# Patient Record
Sex: FEMALE | Race: WHITE | NOT HISPANIC OR LATINO | Employment: UNEMPLOYED | URBAN - METROPOLITAN AREA
[De-identification: names, ages, dates, MRNs, and addresses within clinical notes are randomized per-mention and may not be internally consistent; named-entity substitution may affect disease eponyms.]

---

## 2017-03-02 ENCOUNTER — OFFICE VISIT (OUTPATIENT)
Dept: FAMILY MEDICINE CLINIC | Facility: CLINIC | Age: 22
End: 2017-03-02

## 2017-03-02 VITALS
DIASTOLIC BLOOD PRESSURE: 70 MMHG | RESPIRATION RATE: 16 BRPM | HEART RATE: 70 BPM | SYSTOLIC BLOOD PRESSURE: 108 MMHG | HEIGHT: 64 IN | WEIGHT: 120 LBS | BODY MASS INDEX: 20.49 KG/M2 | OXYGEN SATURATION: 98 %

## 2017-03-02 DIAGNOSIS — F43.21 GRIEF: ICD-10-CM

## 2017-03-02 DIAGNOSIS — F41.9 ANXIETY: Primary | ICD-10-CM

## 2017-03-02 PROCEDURE — 99213 OFFICE O/P EST LOW 20 MIN: CPT | Performed by: INTERNAL MEDICINE

## 2017-03-02 RX ORDER — NORETHINDRONE ACETATE AND ETHINYL ESTRADIOL, ETHINYL ESTRADIOL AND FERROUS FUMARATE 1MG-10(24)
KIT ORAL
COMMUNITY
Start: 2017-01-29 | End: 2017-07-18

## 2017-03-02 NOTE — PROGRESS NOTES
"Subjective   Patient ID: Casper Monzon is a 21 y.o. female presents with   Chief Complaint   Patient presents with   • Anxiety     Bird Patient- father passed away suddenly, needs medication for anxiety, depression, panic attacks, and insomnia   • Depression       HPI - this patient presents today with grief and anxiety.  Her father  in the last few days.  There is grief counseling arranged according to her mother.    Assessment plan    Anxiety grief insomnia-recommend grief counseling patient may take Xanax as needed however, she didn't want a prescription currently.    Allergies   Allergen Reactions   • No Known Drug Allergy        The following portions of the patient's history were reviewed and updated as appropriate: allergies, current medications, past family history, past medical history, past social history, past surgical history and problem list.      Review of Systems   Constitutional: Positive for fatigue.   HENT: Negative.    Respiratory: Negative.    Cardiovascular: Negative.    Psychiatric/Behavioral: Positive for sleep disturbance. The patient is nervous/anxious.        Objective     Vitals:    17 0850   BP: 108/70   Pulse: 70   Resp: 16   SpO2: 98%   Weight: 120 lb (54.4 kg)   Height: 64\" (162.6 cm)         Physical Exam   Constitutional: She is oriented to person, place, and time. She appears well-developed and well-nourished.   HENT:   Head: Normocephalic and atraumatic.   Cardiovascular: Normal rate, regular rhythm and normal heart sounds.    Pulmonary/Chest: Effort normal and breath sounds normal.   Neurological: She is alert and oriented to person, place, and time.   Nursing note and vitals reviewed.        Casper was seen today for anxiety and depression.    Diagnoses and all orders for this visit:    Anxiety    Grief        Call or return to clinic prn if these symptoms worsen or fail to improve as anticipated.  "

## 2017-07-18 ENCOUNTER — OFFICE VISIT (OUTPATIENT)
Dept: FAMILY MEDICINE CLINIC | Facility: CLINIC | Age: 22
End: 2017-07-18

## 2017-07-18 VITALS
HEART RATE: 82 BPM | TEMPERATURE: 97.7 F | HEIGHT: 64 IN | BODY MASS INDEX: 26.14 KG/M2 | OXYGEN SATURATION: 100 % | DIASTOLIC BLOOD PRESSURE: 68 MMHG | WEIGHT: 153.1 LBS | SYSTOLIC BLOOD PRESSURE: 104 MMHG

## 2017-07-18 DIAGNOSIS — J06.9 VIRAL URI: Primary | ICD-10-CM

## 2017-07-18 DIAGNOSIS — F41.9 ANXIETY: ICD-10-CM

## 2017-07-18 PROCEDURE — 99213 OFFICE O/P EST LOW 20 MIN: CPT | Performed by: FAMILY MEDICINE

## 2017-07-18 RX ORDER — PSEUDOEPHEDRINE HCL 30 MG
30 TABLET ORAL EVERY 4 HOURS PRN
COMMUNITY
End: 2020-09-08

## 2017-07-18 NOTE — PROGRESS NOTES
"Subjective   Casper Monzon is a 22 y.o. female.     Chief Complaint   Patient presents with   • URI     head cold x fri   • Sore Throat   • Stress     her father passed a way in feb and moving to [place]        History of Present Illness    4 days of URI symptoms.  Sore throat.  And sinus congestion.  Now some cough and congestion in the chest.  No high fever.  No shortness of breath.  Patient had some anxiety symptoms.  Some occasional muscle tension discomfort in the chest.  Going on for a few months.  Father  of what sounds like complications from sepsis a few months ago.  She states she is coping.  No panic attacks.  Some mild and he don't he.  Less exercise.  However no major depression symptoms.  No panic attacks.  She is going to be moving to [place] with her  to attend a [employer]ist education institution.         The following portions of the patient's history were reviewed and updated as appropriate: allergies, current medications, past family history, past medical history, past social history, past surgical history and problem list.          Review of Systems   Constitutional: Negative for fatigue and fever.   HENT: Positive for congestion and sore throat.    Respiratory: Positive for cough.    Gastrointestinal: Positive for diarrhea ( Mild).   Skin: Negative for rash.   Psychiatric/Behavioral: Negative for dysphoric mood. The patient is nervous/anxious.        Objective   Blood pressure 104/68, pulse 82, temperature 97.7 °F (36.5 °C), temperature source Oral, height 64\" (162.6 cm), weight 153 lb 1.6 oz (69.4 kg), SpO2 100 %.  Physical Exam   Constitutional: No distress.   No acute distress.  Nontoxic.   HENT:   Right Ear: Tympanic membrane, external ear and ear canal normal.   Left Ear: Tympanic membrane, external ear and ear canal normal.   Nose: Nose normal.   Mouth/Throat: Oropharynx is clear and moist. No oropharyngeal exudate.   Eyes: Conjunctivae are normal. Right eye exhibits no discharge. " Left eye exhibits no discharge. No scleral icterus.   Cardiovascular: Normal rate.    Pulmonary/Chest: Effort normal and breath sounds normal. No stridor. No respiratory distress. She has no wheezes. She has no rales.   No tachypnea   Lymphadenopathy:     She has no cervical adenopathy.   Skin: No rash noted.   Nursing note and vitals reviewed.      Assessment/Plan   Casper was seen today for uri, sore throat and stress.    Diagnoses and all orders for this visit:    Viral URI    Anxiety    Very probable viral URI.  Recommend continuing over-the-counter medication.  She's been taking some pseudoephedrine and his help some of the sinus congestion.  She'll be leaving town in a few weeks to move to [place].  With high fever, or worsening symptoms start a Z-Nicola, handwritten prescription given.  Then seek medical attention for further investigation.  Patient will call with questions.    Anxiety.  Some mild grief.  Overall coping well.  No red flags.  Recommend increased exercise, consider cognitive behavioral therapy with a counselor, and also recommend mindfulness meditation.  This was discussed.  This time no need for medication.

## 2020-09-08 ENCOUNTER — OFFICE VISIT (OUTPATIENT)
Dept: FAMILY MEDICINE CLINIC | Facility: CLINIC | Age: 25
End: 2020-09-08

## 2020-09-08 ENCOUNTER — RESULTS ENCOUNTER (OUTPATIENT)
Dept: FAMILY MEDICINE CLINIC | Facility: CLINIC | Age: 25
End: 2020-09-08

## 2020-09-08 VITALS
DIASTOLIC BLOOD PRESSURE: 88 MMHG | BODY MASS INDEX: 30.35 KG/M2 | OXYGEN SATURATION: 98 % | TEMPERATURE: 97.7 F | SYSTOLIC BLOOD PRESSURE: 130 MMHG | HEIGHT: 65 IN | HEART RATE: 90 BPM | WEIGHT: 182.2 LBS

## 2020-09-08 DIAGNOSIS — Z00.00 ROUTINE PHYSICAL EXAMINATION: ICD-10-CM

## 2020-09-08 DIAGNOSIS — E78.2 MIXED HYPERLIPIDEMIA: ICD-10-CM

## 2020-09-08 DIAGNOSIS — F41.9 ANXIETY: ICD-10-CM

## 2020-09-08 DIAGNOSIS — R03.0 ELEVATED BLOOD PRESSURE READING: Primary | ICD-10-CM

## 2020-09-08 PROCEDURE — 99204 OFFICE O/P NEW MOD 45 MIN: CPT | Performed by: FAMILY MEDICINE

## 2020-09-08 RX ORDER — MULTIVITAMIN WITH IRON
TABLET ORAL
COMMUNITY

## 2020-09-08 RX ORDER — LANOLIN ALCOHOL/MO/W.PET/CERES
400 CREAM (GRAM) TOPICAL DAILY
Qty: 30 TABLET | Refills: 0 | Status: SHIPPED | OUTPATIENT
Start: 2020-09-08

## 2020-09-08 RX ORDER — HYDROXYZINE HYDROCHLORIDE 25 MG/1
25 TABLET, FILM COATED ORAL 3 TIMES DAILY PRN
Qty: 30 TABLET | Refills: 0 | Status: SHIPPED | OUTPATIENT
Start: 2020-09-08

## 2020-09-08 NOTE — PROGRESS NOTES
Subjective   Casper ROBERTSON is a 25 y.o. female.     Chief Complaint   Patient presents with   • Hypertension     NP est, Has Hx of Pre-eclampsia with still birth. C/o of muscle twitches in legs, butt and eyes with odd tingles in her scalp. Tried magnesium to see if it helps but isn't sure if its effective. Wanting to try conceiving again after knowing if she needs other things in line. Was told to have US and genetic testing due to child having only one kidney.    • Hyperlipidemia       History of Present Illness Casper ROBERTSON is a 25-year-old female patient came here to establish care, routine physical and follow-up on her blood pressure.  Patient lost her baby at 7 months 10 weeks ago.  She is giving history of preeclampsia at that time.  She was discharged home on Procardia.  She is checking her blood pressure every day at home and her blood pressures ranging from 127//90.  He denies any headache blurring of vision or dizziness.  She stopped taking Procardia since July.  Patient denies any history of blood pressure before her pregnancy.  She is also complaining of muscle spasms.  She also giving history of increased anxiety and panic attack.        Past Medical History:   Diagnosis Date   • H/O intrauterine death    • Preeclampsia, severe, second trimester        Past Surgical History:   Procedure Laterality Date   • WISDOM TOOTH EXTRACTION         Family History   Problem Relation Age of Onset   • Hypertension Mother    • Pulmonary embolism Father    • Pneumonia Father    • Diabetes Maternal Aunt    • Hypertension Paternal Grandmother    • Diabetes Paternal Grandmother        Social History     Socioeconomic History   • Marital status:      Spouse name: Not on file   • Number of children: Not on file   • Years of education: Not on file   • Highest education level: Not on file   Tobacco Use   • Smoking status: Never Smoker   • Smokeless tobacco: Never Used   Substance and Sexual Activity   •  "Alcohol use: Yes     Comment: Socially   • Drug use: No   • Sexual activity: Yes     Partners: Male       The following portions of the patient's history were reviewed and updated as appropriate: allergies, current medications, past family history, past medical history, past social history, past surgical history and problem list.    Review of Systems   Constitutional: Negative for activity change, fatigue, fever, unexpected weight gain and unexpected weight loss.   HENT: Negative for congestion, mouth sores, sinus pressure and sore throat.    Eyes: Negative for blurred vision and visual disturbance.   Respiratory: Negative for cough, chest tightness, shortness of breath and wheezing.    Cardiovascular: Negative for chest pain, palpitations and leg swelling.   Gastrointestinal: Negative for abdominal pain, constipation, diarrhea, nausea, vomiting and indigestion.   Endocrine: Negative for cold intolerance, polydipsia and polyuria.   Genitourinary: Negative for dysuria, frequency, hematuria, urgency and urinary incontinence.   Musculoskeletal: Negative for back pain, gait problem and neck pain.   Skin: Negative for color change and rash.   Neurological: Negative for dizziness, speech difficulty, weakness, headache and confusion.   Psychiatric/Behavioral: Negative for agitation, sleep disturbance and depressed mood. The patient is not nervous/anxious.            Objective   Vitals:    09/08/20 1330 09/08/20 1422   BP: 140/90 130/88   Pulse: 90    Temp: 97.7 °F (36.5 °C)    SpO2: 98%    Weight: 82.6 kg (182 lb 3.2 oz)    Height: 165.1 cm (65\")      Body mass index is 30.32 kg/m².  Physical Exam   Constitutional: She is oriented to person, place, and time. She appears well-nourished. No distress.   HENT:   Head: Normocephalic and atraumatic.   Right Ear: External ear normal.   Left Ear: External ear normal.   Nose: Nose normal.   Mouth/Throat: Oropharynx is clear and moist.   Eyes: Pupils are equal, round, and reactive " to light. Conjunctivae are normal.   Neck: Normal range of motion. Neck supple.   Cardiovascular: Normal rate, regular rhythm and normal heart sounds.   Pulmonary/Chest: Effort normal and breath sounds normal. She has no wheezes. She has no rales.   Abdominal: Soft. Bowel sounds are normal. She exhibits no distension. There is no tenderness.   Musculoskeletal: Normal range of motion.   Lymphadenopathy:     She has no cervical adenopathy.   Neurological: She is alert and oriented to person, place, and time. No cranial nerve deficit or sensory deficit. She exhibits normal muscle tone.   Skin: Skin is warm. Capillary refill takes less than 2 seconds. No rash noted.   Psychiatric: She has a normal mood and affect. Her behavior is normal.   Nursing note and vitals reviewed.      Assessment/Plan   Problem List Items Addressed This Visit        Other    Anxiety    Relevant Orders    CBC & Differential (Completed)    TSH+Free T4 (Completed)      Other Visit Diagnoses     Elevated blood pressure reading    -  Primary    Relevant Orders    Comprehensive Metabolic Panel (Completed)    Uric Acid (Completed)    US Renal Bilateral    Mixed hyperlipidemia        Relevant Orders    Lipid Panel (Completed)    Routine physical examination        Relevant Orders    CBC & Differential (Completed)    Comprehensive Metabolic Panel (Completed)    Urinalysis With Culture If Indicated - Urine, Random Void (Completed)      Casper ROBERTSON is a 25-year-old female patient came here for  Routine physical, preventive screening and healthy lifestyle discussed with patient.  He is up-to-date Tdap and Pap smear.  Flu vaccine recommended to patient.  Denies any history of high blood pressure before her pregnancy.  Mom developed blood high blood pressure during in her 50s.  She does not smoke.  DASH diet recommended to patient.  I also advised her to keep a blood pressure log.  With her history of preeclampsia and increased blood pressure.  She is  postpartum almost 10 weeks.  I will do renal ultrasound to rule out renal artery stenosis.  We will also do baseline labs including kidney function.  Hyperlipidemia, patient with history of hyperlipidemia in the past but not on medication.  We will recheck her lipids.  Anxiety, complaining of panic attack.  She has a history of anxiety in the past but does not want to take the medication.  I will start her on hydroxyzine PRN for acute anxiety.  Counseling done.  Advised her to also follow-up with her OB.        Return in about 3 weeks (around 9/29/2020), or if symptoms worsen or fail to improve.

## 2020-09-09 ENCOUNTER — LAB (OUTPATIENT)
Dept: LAB | Facility: HOSPITAL | Age: 25
End: 2020-09-09

## 2020-09-09 DIAGNOSIS — R03.0 ELEVATED BLOOD PRESSURE READING: ICD-10-CM

## 2020-09-09 DIAGNOSIS — Z00.00 ROUTINE PHYSICAL EXAMINATION: ICD-10-CM

## 2020-09-09 DIAGNOSIS — F41.9 ANXIETY: ICD-10-CM

## 2020-09-09 DIAGNOSIS — E78.2 MIXED HYPERLIPIDEMIA: ICD-10-CM

## 2020-09-09 LAB
ALBUMIN SERPL-MCNC: 4.6 G/DL (ref 3.5–5.2)
ALBUMIN/GLOB SERPL: 1.6 G/DL
ALP SERPL-CCNC: 60 U/L (ref 39–117)
ALT SERPL W P-5'-P-CCNC: 28 U/L (ref 1–33)
ANION GAP SERPL CALCULATED.3IONS-SCNC: 10 MMOL/L (ref 5–15)
AST SERPL-CCNC: 24 U/L (ref 1–32)
BASOPHILS # BLD AUTO: 0.06 10*3/MM3 (ref 0–0.2)
BASOPHILS NFR BLD AUTO: 0.9 % (ref 0–1.5)
BILIRUB SERPL-MCNC: 0.3 MG/DL (ref 0–1.2)
BILIRUB UR QL STRIP: NEGATIVE
BUN SERPL-MCNC: 12 MG/DL (ref 6–20)
BUN/CREAT SERPL: 15.6 (ref 7–25)
CALCIUM SPEC-SCNC: 9.5 MG/DL (ref 8.6–10.5)
CHLORIDE SERPL-SCNC: 104 MMOL/L (ref 98–107)
CHOLEST SERPL-MCNC: 194 MG/DL (ref 0–200)
CLARITY UR: CLEAR
CO2 SERPL-SCNC: 24 MMOL/L (ref 22–29)
COLOR UR: YELLOW
CREAT SERPL-MCNC: 0.77 MG/DL (ref 0.57–1)
DEPRECATED RDW RBC AUTO: 41.1 FL (ref 37–54)
EOSINOPHIL # BLD AUTO: 0.12 10*3/MM3 (ref 0–0.4)
EOSINOPHIL NFR BLD AUTO: 1.7 % (ref 0.3–6.2)
ERYTHROCYTE [DISTWIDTH] IN BLOOD BY AUTOMATED COUNT: 12.4 % (ref 12.3–15.4)
GFR SERPL CREATININE-BSD FRML MDRD: 90 ML/MIN/1.73
GLOBULIN UR ELPH-MCNC: 2.9 GM/DL
GLUCOSE SERPL-MCNC: 96 MG/DL (ref 65–99)
GLUCOSE UR STRIP-MCNC: NEGATIVE MG/DL
HCT VFR BLD AUTO: 40.6 % (ref 34–46.6)
HDLC SERPL-MCNC: 74 MG/DL (ref 40–60)
HGB BLD-MCNC: 13.6 G/DL (ref 12–15.9)
HGB UR QL STRIP.AUTO: NEGATIVE
IMM GRANULOCYTES # BLD AUTO: 0.01 10*3/MM3 (ref 0–0.05)
IMM GRANULOCYTES NFR BLD AUTO: 0.1 % (ref 0–0.5)
KETONES UR QL STRIP: NEGATIVE
LDLC SERPL CALC-MCNC: 111 MG/DL (ref 0–100)
LDLC/HDLC SERPL: 1.5 {RATIO}
LEUKOCYTE ESTERASE UR QL STRIP.AUTO: NEGATIVE
LYMPHOCYTES # BLD AUTO: 1.9 10*3/MM3 (ref 0.7–3.1)
LYMPHOCYTES NFR BLD AUTO: 27.1 % (ref 19.6–45.3)
MCH RBC QN AUTO: 30.2 PG (ref 26.6–33)
MCHC RBC AUTO-ENTMCNC: 33.5 G/DL (ref 31.5–35.7)
MCV RBC AUTO: 90 FL (ref 79–97)
MONOCYTES # BLD AUTO: 0.45 10*3/MM3 (ref 0.1–0.9)
MONOCYTES NFR BLD AUTO: 6.4 % (ref 5–12)
NEUTROPHILS NFR BLD AUTO: 4.48 10*3/MM3 (ref 1.7–7)
NEUTROPHILS NFR BLD AUTO: 63.8 % (ref 42.7–76)
NITRITE UR QL STRIP: NEGATIVE
NRBC BLD AUTO-RTO: 0 /100 WBC (ref 0–0.2)
PH UR STRIP.AUTO: 7 [PH] (ref 5–8)
PLATELET # BLD AUTO: 276 10*3/MM3 (ref 140–450)
PMV BLD AUTO: 9.7 FL (ref 6–12)
POTASSIUM SERPL-SCNC: 4.4 MMOL/L (ref 3.5–5.2)
PROT SERPL-MCNC: 7.5 G/DL (ref 6–8.5)
PROT UR QL STRIP: NEGATIVE
RBC # BLD AUTO: 4.51 10*6/MM3 (ref 3.77–5.28)
SODIUM SERPL-SCNC: 138 MMOL/L (ref 136–145)
SP GR UR STRIP: 1.02 (ref 1–1.03)
T4 FREE SERPL-MCNC: 1.37 NG/DL (ref 0.93–1.7)
TRIGL SERPL-MCNC: 45 MG/DL (ref 0–150)
TSH SERPL DL<=0.05 MIU/L-ACNC: 1.06 UIU/ML (ref 0.27–4.2)
URATE SERPL-MCNC: 4 MG/DL (ref 2.4–5.7)
UROBILINOGEN UR QL STRIP: NORMAL
VLDLC SERPL-MCNC: 9 MG/DL (ref 5–40)
WBC # BLD AUTO: 7.02 10*3/MM3 (ref 3.4–10.8)

## 2020-09-09 PROCEDURE — 84443 ASSAY THYROID STIM HORMONE: CPT | Performed by: FAMILY MEDICINE

## 2020-09-09 PROCEDURE — 84439 ASSAY OF FREE THYROXINE: CPT | Performed by: FAMILY MEDICINE

## 2020-09-09 PROCEDURE — 80053 COMPREHEN METABOLIC PANEL: CPT

## 2020-09-09 PROCEDURE — 81003 URINALYSIS AUTO W/O SCOPE: CPT | Performed by: FAMILY MEDICINE

## 2020-09-09 PROCEDURE — 84550 ASSAY OF BLOOD/URIC ACID: CPT

## 2020-09-09 PROCEDURE — 80061 LIPID PANEL: CPT | Performed by: FAMILY MEDICINE

## 2020-09-09 PROCEDURE — 36415 COLL VENOUS BLD VENIPUNCTURE: CPT

## 2020-09-09 PROCEDURE — 85025 COMPLETE CBC W/AUTO DIFF WBC: CPT

## 2020-09-10 ENCOUNTER — TELEPHONE (OUTPATIENT)
Dept: FAMILY MEDICINE CLINIC | Facility: CLINIC | Age: 25
End: 2020-09-10

## 2020-09-10 NOTE — TELEPHONE ENCOUNTER
HUB please inform and message confirmation:    Called pt to inform her that her labs and urine were normal. Continue taking your blood pressure and keep a log. We will see her on the 23rd

## 2020-09-11 ENCOUNTER — TELEPHONE (OUTPATIENT)
Dept: FAMILY MEDICINE CLINIC | Facility: CLINIC | Age: 25
End: 2020-09-11

## 2020-09-11 NOTE — TELEPHONE ENCOUNTER
PATIENT IS CALLING IN REGARDS TO PATIENT WAS SEEN AT URGENT CARE, PATIENT WAS TOLD SHE MAY HAVE STRAINED HER CALF MUSCLE    PLEASE FOLLOW UP WITH ANY QUESTIONS   [phone]

## 2020-09-16 ENCOUNTER — HOSPITAL ENCOUNTER (OUTPATIENT)
Dept: ULTRASOUND IMAGING | Facility: HOSPITAL | Age: 25
Discharge: HOME | End: 2020-09-16
Admitting: FAMILY MEDICINE

## 2020-09-16 DIAGNOSIS — R03.0 ELEVATED BLOOD PRESSURE READING: ICD-10-CM

## 2020-09-16 DIAGNOSIS — R03.0 ELEVATED BLOOD PRESSURE READING: Primary | ICD-10-CM

## 2020-09-16 PROCEDURE — 76775 US EXAM ABDO BACK WALL LIM: CPT

## 2020-09-17 ENCOUNTER — HOSPITAL ENCOUNTER (OUTPATIENT)
Dept: CARDIOLOGY | Facility: HOSPITAL | Age: 25
Discharge: HOME | End: 2020-09-17
Admitting: FAMILY MEDICINE

## 2020-09-17 DIAGNOSIS — R03.0 ELEVATED BLOOD PRESSURE READING: ICD-10-CM

## 2020-09-17 LAB
BH CV ECHO MEAS - DIST REN A EDV LEFT: 38.4 CM/S
BH CV ECHO MEAS - DIST REN A PSV LEFT: 99.6 CM/S
BH CV ECHO MEAS - MID REN A EDV LEFT: 41.3 CM/S
BH CV ECHO MEAS - MID REN A PSV LEFT: 112 CM/S
BH CV ECHO MEAS - PROX REN A EDV LEFT: 39.3 CM/S
BH CV ECHO MEAS - PROX REN A PSV LEFT: 102 CM/S
BH CV VAS BP LEFT ARM: NORMAL MMHG
BH CV VAS BP RIGHT ARM: NORMAL MMHG
BH CV VAS RENAL AORTIC MID PSV: 85.9 CM/S
BH CV VAS RENAL HILUM LEFT EDV: 11 CM/S
BH CV VAS RENAL HILUM LEFT PSV: 30.3 CM/S
BH CV VAS RENAL HILUM RIGHT EDV: 18.2 CM/S
BH CV VAS RENAL HILUM RIGHT PSV: 52.3 CM/S
BH CV XLRA MEAS - KID L LEFT: 11.2 CM
BH CV XLRA MEAS DIST REN A EDV RIGHT: 30.8 CM/S
BH CV XLRA MEAS DIST REN A PSV RIGHT: 87 CM/S
BH CV XLRA MEAS KID L RIGHT: 10.9 CM
BH CV XLRA MEAS KID W RIGHT: 5.3 CM
BH CV XLRA MEAS MID REN A EDV RIGHT: 30.8 CM/S
BH CV XLRA MEAS MID REN A PSV RIGHT: 112 CM/S
BH CV XLRA MEAS PROX REN A EDV RIGHT: 30.7 CM/S
BH CV XLRA MEAS PROX REN A PSV RIGHT: 105 CM/S
BH CV XLRA MEAS RAR LEFT: 1.3
BH CV XLRA MEAS RAR RIGHT: 1.2
BH CV XLRA MEAS RENAL A ORG EDV LEFT: 21.5 CM/S
BH CV XLRA MEAS RENAL A ORG EDV RIGHT: 23.7 CM/S
BH CV XLRA MEAS RENAL A ORG PSV LEFT: 72.1 CM/S
BH CV XLRA MEAS RENAL A ORG PSV RIGHT: 91.7 CM/S
LEFT KIDNEY WIDTH: 5.5 CM
LEFT RENAL UPPER PARENCHYMA MAX: 21.5 CM/S
LEFT RENAL UPPER PARENCHYMA MIN: 8.8 CM/S
LEFT RENAL UPPER PARENCHYMA RI: 0.59
RIGHT RENAL UPPER PARENCHYMA MAX: 28.1 CM/S
RIGHT RENAL UPPER PARENCHYMA MIN: 11.6 CM/S
RIGHT RENAL UPPER PARENCHYMA RI: 0.59

## 2020-09-17 PROCEDURE — 93975 VASCULAR STUDY: CPT

## 2020-09-23 ENCOUNTER — OFFICE VISIT (OUTPATIENT)
Dept: FAMILY MEDICINE CLINIC | Facility: CLINIC | Age: 25
End: 2020-09-23

## 2020-09-23 VITALS
TEMPERATURE: 97.8 F | HEIGHT: 65 IN | HEART RATE: 96 BPM | WEIGHT: 181.6 LBS | SYSTOLIC BLOOD PRESSURE: 137 MMHG | BODY MASS INDEX: 30.26 KG/M2 | OXYGEN SATURATION: 99 % | DIASTOLIC BLOOD PRESSURE: 87 MMHG

## 2020-09-23 DIAGNOSIS — R03.0 ELEVATED BLOOD PRESSURE READING: Primary | ICD-10-CM

## 2020-09-23 DIAGNOSIS — F41.9 ANXIETY: ICD-10-CM

## 2020-09-23 PROCEDURE — 93000 ELECTROCARDIOGRAM COMPLETE: CPT | Performed by: FAMILY MEDICINE

## 2020-09-23 PROCEDURE — 99213 OFFICE O/P EST LOW 20 MIN: CPT | Performed by: FAMILY MEDICINE

## 2020-09-23 NOTE — PROGRESS NOTES
Subjective   Casper ROBERTSON is a 25 y.o. female.     Chief Complaint   Patient presents with   • Hypertension     f/u       History of Present Illness Casper ROBERTSON is a 25-year-old female patient came here for follow-up on her elevated blood pressure and blood per.  Patient was seen first time 2 weeks ago.  She has history of preeclampsia.  Patient also complaining of increased anxiety.  I gave her a prescription for hydroxyzine and has helped her with anxiety.  She is complaining of anxiety since 2017 after her dad's death suddenly.  Her blood pressure at home is normal.  I reviewed her blood pressure log and blood pressure has been between 1 21-1 14/72.96.  Denies had any headache or blurry of vision.      Past Medical History:   Diagnosis Date   • H/O intrauterine death    • Preeclampsia, severe, second trimester        Past Surgical History:   Procedure Laterality Date   • WISDOM TOOTH EXTRACTION         Family History   Problem Relation Age of Onset   • Hypertension Mother    • Pulmonary embolism Father    • Pneumonia Father    • Diabetes Maternal Aunt    • Hypertension Paternal Grandmother    • Diabetes Paternal Grandmother        Social History     Socioeconomic History   • Marital status:      Spouse name: Not on file   • Number of children: Not on file   • Years of education: Not on file   • Highest education level: Not on file   Tobacco Use   • Smoking status: Never Smoker   • Smokeless tobacco: Never Used   Substance and Sexual Activity   • Alcohol use: Yes     Comment: Socially   • Drug use: No   • Sexual activity: Yes     Partners: Male       The following portions of the patient's history were reviewed and updated as appropriate: allergies, current medications, past family history, past medical history, past social history, past surgical history and problem list.    Review of Systems   Constitutional: Negative for activity change, appetite change, fatigue, fever, unexpected weight gain and  "unexpected weight loss.   HENT: Negative for congestion, ear pain, postnasal drip, rhinorrhea, sinus pressure and sore throat.    Eyes: Negative for blurred vision, discharge, itching and visual disturbance.   Respiratory: Negative for cough, chest tightness, shortness of breath and wheezing.    Cardiovascular: Negative for chest pain, palpitations and leg swelling.   Gastrointestinal: Negative for abdominal pain, constipation, diarrhea, nausea and vomiting.   Musculoskeletal: Negative for arthralgias.   Neurological: Negative for headache.   Psychiatric/Behavioral: Negative for agitation, suicidal ideas, depressed mood and stress.           Objective   Vitals:    09/23/20 1137   BP: 137/87   Pulse: 96   Temp: 97.8 °F (36.6 °C)   TempSrc: Temporal   SpO2: 99%   Weight: 82.4 kg (181 lb 9.6 oz)   Height: 165.1 cm (65\")     Body mass index is 30.22 kg/m².  Physical Exam  Constitutional:       Appearance: Normal appearance. She is well-developed.   HENT:      Head: Normocephalic and atraumatic.      Right Ear: Tympanic membrane, ear canal and external ear normal.      Left Ear: Tympanic membrane, ear canal and external ear normal.      Nose: Nose normal.      Mouth/Throat:      Mouth: Mucous membranes are moist.   Eyes:      General: No scleral icterus.     Extraocular Movements: Extraocular movements intact.      Conjunctiva/sclera: Conjunctivae normal.      Pupils: Pupils are equal, round, and reactive to light.   Neck:      Musculoskeletal: Normal range of motion and neck supple.      Thyroid: No thyromegaly.   Cardiovascular:      Rate and Rhythm: Normal rate and regular rhythm.      Pulses: Normal pulses.      Heart sounds: Normal heart sounds.   Pulmonary:      Effort: Pulmonary effort is normal. No respiratory distress.      Breath sounds: Normal breath sounds. No wheezing or rales.   Abdominal:      General: Bowel sounds are normal. There is no distension.      Palpations: Abdomen is soft. There is no mass.      " Tenderness: There is no abdominal tenderness.      Hernia: No hernia is present.   Musculoskeletal: Normal range of motion.         General: No swelling or tenderness.   Lymphadenopathy:      Cervical: No cervical adenopathy.   Skin:     General: Skin is warm.   Neurological:      General: No focal deficit present.      Mental Status: She is alert and oriented to person, place, and time.      Cranial Nerves: No cranial nerve deficit.      Sensory: No sensory deficit.      Deep Tendon Reflexes: Reflexes normal.   Psychiatric:         Mood and Affect: Mood normal.         Behavior: Behavior normal.         Thought Content: Thought content normal.         Judgment: Judgment normal.         ECG 12 Lead    Date/Time: 9/23/2020 11:58 AM  Performed by: Sade Conteh MD  Authorized by: Sade Conteh MD   Comparison: not compared with previous ECG   Rhythm: sinus rhythm  Conduction: conduction normal    Clinical impression: normal ECG          Assessment/Plan   Problem List Items Addressed This Visit        Other    Anxiety    Relevant Orders    ECG 12 Lead      Other Visit Diagnoses     Elevated blood pressure reading    -  Primary    Relevant Orders    ECG 12 Lead      Casper ROBERTSON is a 25-year-old female patient came here for follow-up on her anxiety and elevated blood pressure.  I discussed the lab results including TSH and T4 with patient.  I advised her to continue keeping the blood pressure log.  I do not think she has a diagnosis of blood pressure..  Also has a discussion about her anxiety.  As patient is trying to conceive again she does not want to start taking the medication if she does not help to take it.  Information of psychotherapy, given to patient.  I will also refer the patient for psychotherapy.  She will again message me her blood pressure reading for 2 weeks.  Return if symptoms worsen or fail to improve, for Recheck.

## 2020-10-01 ENCOUNTER — TELEPHONE (OUTPATIENT)
Dept: FAMILY MEDICINE CLINIC | Facility: CLINIC | Age: 25
End: 2020-10-01

## 2020-10-01 NOTE — TELEPHONE ENCOUNTER
PATIENT STATES: can she change folic acid (V-R FOLIC ACID) 400 MCG tablet to just folic please advise     PATIENT CAN BE REACHED ON:[phone]    PHARMACY PREFERRED:[facility] #[id number] - [facility] [address] [facility] AT [facility] [address] - [phone] [facility] [phone] FX

## 2020-10-01 NOTE — TELEPHONE ENCOUNTER
Called and lvm for pt. Hub please inform her that she can change to regular folic acid. The 400 mcg is available over the counter for about $5.29

## 2020-12-29 ENCOUNTER — TELEPHONE (OUTPATIENT)
Dept: FAMILY MEDICINE CLINIC | Facility: CLINIC | Age: 25
End: 2020-12-29

## 2020-12-29 NOTE — TELEPHONE ENCOUNTER
PATIENT STATES SHE TOOK AN OVER THE COUNTER TEST FOR UTI AND SHE THINKS SHE HAS ONE. SHE IS ASKING WHEN SHE CAN COME IN FOR APPT OR GET MEDICATION CALLED IN TO PHARMACY. PATIENT WOULD LIKE TO HAVE A CALL BACK ALSO ABOUT SOME BLOOD  A WEEK AFTER HER PERIOD AFTER A WORKOUT.     PLEASE ADVISE  [phone]    [facility] #[id number] [place] [address] AT [facility] [address] - [phone]  - [phone] FX

## 2020-12-29 NOTE — TELEPHONE ENCOUNTER
Informed pt that Dr. Conteh is currently out of office today. Urged her to head to  for further evaluation as we had no openings today or we could see her in office next week upon Dr. Conteh's return

## 2021-04-16 ENCOUNTER — BULK ORDERING (OUTPATIENT)
Dept: CASE MANAGEMENT | Facility: OTHER | Age: 26
End: 2021-04-16

## 2021-04-16 DIAGNOSIS — Z23 IMMUNIZATION DUE: ICD-10-CM
